# Patient Record
(demographics unavailable — no encounter records)

---

## 2024-11-03 NOTE — PHYSICAL EXAM
[Normal Appearance] : normal appearance [Well Groomed] : well groomed [General Appearance - In No Acute Distress] : no acute distress [Edema] : no peripheral edema [Respiration, Rhythm And Depth] : normal respiratory rhythm and effort [Exaggerated Use Of Accessory Muscles For Inspiration] : no accessory muscle use [Abdomen Soft] : soft [Abdomen Tenderness] : non-tender [Costovertebral Angle Tenderness] : no ~M costovertebral angle tenderness [Urinary Bladder Findings] : the bladder was normal on palpation [Normal Station and Gait] : the gait and station were normal for the patient's age [] : no rash [No Focal Deficits] : no focal deficits [Oriented To Time, Place, And Person] : oriented to person, place, and time [Affect] : the affect was normal [Mood] : the mood was normal [No Palpable Adenopathy] : no palpable adenopathy [de-identified] : no tenderness, No clinical bulge. Q tip negative

## 2024-11-03 NOTE — END OF VISIT
Number Of Treatment Days: 1 Dimensions-X Axis In Cm: 1.5 [Time Spent: ___ minutes] : I have spent [unfilled] minutes of time on the encounter which excludes teaching and separately reported services. Detail Level: Detailed Treatment Margins In Cm: 0.8 Energy (Optional-Please Include Units): 70kV Total Number Of Fractions Rx 3: 15 Total Dose (Optional-Please Include Units): 5750.25cGy Render Additional Prescriptions In Note?: No Fractions / Week Rx 3: 5 Treatment Device Design After Initial Simulation Justification (Will Render If Bill For Treatment Devices = Yes): The patient is status post radiation simulation and is evaluated as to the use of additional devices for shielding and placement for radiation therapy. Fractions / Week: 3 Functional Status: 0 (fully active) Include Rx 4 When Rendering Additional Prescriptions: Yes Depth (Optional-Please Include Units): 1.98 Pathology Override (Pathology Will Render As Diagnosis Name If Left Blank): Squamous Cell Carcinoma-Invasive Additional Prescription Justification Text: If there is any interruption in treatment exceeding 5 days please see Decay and Dose Adjustment Calculation and complete treatment under Prescription 2, 3 or 4 as appropriate. Limb Positioning Or Elevation: Legs Extended Simple Simulation Afterword Text Will Be Included With Simple Simulations (Indications............): The patient had a complete consultation regarding all applicable modalities for the treatment of their lesion and based on a variety of factors including the type of lesion, size, and location, the relevant medical history as well as local tissue factors, the functional status of the individual, the ability to perform necessary postoperative wound instructions and the need for simultaneous treatments as well as overall wound healing status, it was determined that the patient would begin radiation therapy treatment for skin cancer.  A full simulation and treatment device design was performed including the determination and formulation of appropriate simple and complex devices including lead shield of 2.286 mm thickness to form molded customized shielding to specifically correlate with the lesion size including treatment margin.  The custom lead shield is adequate to accommodate the appropriate applicator and provide adequate shielding around the treatment site.  The specific field applicator, shields, and devices both simple and complex as well as the specific patient setup is outlined below.  The patient was given a full consent for superficial radiation to both verbally and in writing and the full determination of patient's eligibility for treatment and selection is outlined on the patient eligibility and treatment selection form.  The specific superficial radiotherapy prescription was determined and was documented on the superficial radiotherapy prescription form.  A treatment calculation was also performed and documented on the treatment calculation form.  Based on the prescription, the patient was scheduled for a series of fractional treatments. Fractionation Number: 16 Information: Selecting Yes will display possible errors in your note based on the variables you have selected. This validation is only offered as a suggestion for you. PLEASE NOTE THAT THE VALIDATION TEXT WILL BE REMOVED WHEN YOU FINALIZE YOUR NOTE. IF YOU WANT TO FAX A PRELIMINARY NOTE YOU WILL NEED TO TOGGLE THIS TO 'NO' IF YOU DO NOT WANT IT IN YOUR FAXED NOTE. Cumulative Dose In Cgy (Optional): 3680.16 Custom Shielding Afterword Text Will Not Be Included With Simple Simulations (X X Y Cm............): port to correlate with the lesion size, including treatment margin. The custom lead shield is adequate to accommodate the appropriate applicator and provide adequate shielding around the treatment site. Additional shielding (as noted below) is used to protect sensitive, normal tissues. Comments: RTOG 1 Treatment Time In Min (Optional): 0.33 Time Dose Fractionation (Optional- Include Units If Applicable): 93 Treatment Time / Fractionation (Optional- Include Units): 0.33min Total Number Of Fractions: 25 Field Size (Applicator): 4.0 cm Shielding Size (Optional- Include Units): 3.0x3.0 Initial Radiation Treatment Planning (Will Render If Bill Simulation = Yes): The patient had a complete consultation regarding all applicable modalities for the treatment of their skin cancer and based on a variety of factors including the type of lesion, size, and location, the relevant medical history as well as local tissue factors, the functional status of the individual, the ability to perform necessary postoperative wound instructions and the need for simultaneous treatments as well as overall wound healing status, it was determined that the patient would begin radiation therapy treatment for skin cancer.  A full simulation and treatment device design was performed including the determination and formulation of appropriate simple and complex devices including lead shield of 0.762 mm thickness to form molded customized shielding to specifically correlate with the lesion size including treatment margin.  The custom lead shield is adequate to accommodate the appropriate applicator and provide adequate shielding around the treatment site.  The specific field applicator, shields, and devices both simple and complex as well as the specific patient setup is outlined below.  The patient was given a full consent for superficial radiation to both verbally and in writing and the full determination of patient's eligibility for treatment and selection is outlined on the patient eligibility and treatment selection form.  The specific superficial radiotherapy prescription was determined and was documented on the superficial radiotherapy prescription form.  A treatment calculation was also performed and documented on the treatment calculation form.  Based on the prescription, the patient was scheduled for a series of fractional treatments. Custom Shielding Preamble Text Will Not Be Included With Simple Simulations (.......... X X Y Cm): A lead shield of 0.762 mm thickness is utilized to form a molded, custom shield with a Patient Positioning: Supine Dose Per Fractionation In Cgy (Optional): 230.01cGy Intro Statement (Will Not Render If Left Blank): The patient is undergoing superficial radiation therapy for skin cancer and presents for weekly evaluation and management.  Per protocol and as documented on the flow sheet, the patient was questioned as to subjective redness, pruritus, pain, drainage, fatigue, or any other symptoms.  Objectively, the radiation area was evaluated with regards to erythema, atrophy, scale, crusting, erosion, ulceration, edema, purpura, tenderness, warmth, drainage, and any other findings.  The plan was extensively reviewed including the dose, and dosing schedule.  The simulation and clinical setup was also reviewed as was the external and any internal shields and based on this review the appropriateness and sufficiency of treatment was determined. Assessment: Appropriate reaction Simple Simulation Preamble Text Will Be Included With Simple Simulations (.......... Indications): Simple simulation was performed today for the following reasons: Dose / Tx In Cgy (Optional): 230.01 Day Of The Week Treatment Administered: Friday

## 2024-11-03 NOTE — HISTORY OF PRESENT ILLNESS
[FreeTextEntry1] : Eight 63-year-old female presents for cystoscopy for history of incomplete bladder emptying post diverticulum repair x 2 with fascial sling. 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  Continues pelvic PT for rectal pain.

## 2024-11-03 NOTE — ASSESSMENT
[FreeTextEntry1] : 64 y/o s/p cystoscopy. Probable opening of urethral diverticulum on left distal ureter vs opening of skenes gland. No clinical diverticulum noted.  Vulvadynia stable with Est/Test vulva cream Can decrease to daily  Advised on Rectal Diazepam for rectal pain issues.  Follow up 3 months pelvci - q tip exam

## 2024-11-03 NOTE — ASSESSMENT
[FreeTextEntry1] : 62 y/o s/p cystoscopy. Probable opening of urethral diverticulum on left distal ureter vs opening of skenes gland. No clinical diverticulum noted.  Vulvadynia stable with Est/Test vulva cream Can decrease to daily  Advised on Rectal Diazepam for rectal pain issues.  Follow up 3 months pelvci - q tip exam

## 2024-11-03 NOTE — PHYSICAL EXAM
[Normal Appearance] : normal appearance [Well Groomed] : well groomed [General Appearance - In No Acute Distress] : no acute distress [Edema] : no peripheral edema [Respiration, Rhythm And Depth] : normal respiratory rhythm and effort [Exaggerated Use Of Accessory Muscles For Inspiration] : no accessory muscle use [Abdomen Soft] : soft [Abdomen Tenderness] : non-tender [Costovertebral Angle Tenderness] : no ~M costovertebral angle tenderness [Urinary Bladder Findings] : the bladder was normal on palpation [Normal Station and Gait] : the gait and station were normal for the patient's age [] : no rash [No Focal Deficits] : no focal deficits [Oriented To Time, Place, And Person] : oriented to person, place, and time [Affect] : the affect was normal [Mood] : the mood was normal [No Palpable Adenopathy] : no palpable adenopathy [de-identified] : no tenderness, No clinical bulge. Q tip negative

## 2025-03-10 NOTE — PHYSICAL EXAM
[Normal Appearance] : normal appearance [Well Groomed] : well groomed [General Appearance - In No Acute Distress] : no acute distress [Edema] : no peripheral edema [Respiration, Rhythm And Depth] : normal respiratory rhythm and effort [Exaggerated Use Of Accessory Muscles For Inspiration] : no accessory muscle use [Abdomen Soft] : soft [Abdomen Tenderness] : non-tender [Costovertebral Angle Tenderness] : no ~M costovertebral angle tenderness [Urinary Bladder Findings] : the bladder was normal on palpation [Normal Station and Gait] : the gait and station were normal for the patient's age [] : no rash [No Focal Deficits] : no focal deficits [Oriented To Time, Place, And Person] : oriented to person, place, and time [Affect] : the affect was normal [Mood] : the mood was normal [No Palpable Adenopathy] : no palpable adenopathy [de-identified] : No prolapse.  Right-sided positive Q-tip left-sided negative small irregularity no erythema not friable positive tenderness with Q-tip [Chaperone Present] : A chaperone was present in the examining room during all aspects of the physical examination [FreeTextEntry2] : Kelly Tay RN

## 2025-03-10 NOTE — ASSESSMENT
[FreeTextEntry1] : 63-year-old female with pelvic floor dysfunction and vulvodynia.  Responding well to testosterone in the past we will restart avidly.  Will restart avidly as well as Intrarosa for vaginal dryness.  Currently not sexually active secondary to 's illness. Follow-up in 3 months UTI symptoms reported office urine dip positive leukocyte Estrace we will send for culture to confirm.

## 2025-03-10 NOTE — HISTORY OF PRESENT ILLNESS
[FreeTextEntry1] : 63-year-old female with pelvic floor dysfunction rectal pain treated by pelvic floor therapy has not started rectal diazepam.  Reports has been sick and has decreased self-care and stopped Intrarosa.  Has just restarted Intrarosa needs refill for testosterone cream for vulvodynia Since last visit has had intermittent urinary tract symptoms which 2 out of 3 were negative with urine cultures at [facility].  Plans to forward results. Feels when goes off diet bladder goes into spasm.  This including junk food cauliflower puffs

## 2025-05-28 NOTE — ASSESSMENT
[FreeTextEntry1] : 63 y/o female with PFD/ Bladder Pain/ Chronic IC. Long standing urethral diverticulum with Vulvadynia Agrees to restart testosterone estradiol vulva cream wishes to have straight Cath for urine cx Urine dip - negative Restart Internal estradiol follow up in 6 weeks.

## 2025-05-28 NOTE — PHYSICAL EXAM
[Normal Appearance] : normal appearance [Well Groomed] : well groomed [General Appearance - In No Acute Distress] : no acute distress [Edema] : no peripheral edema [Respiration, Rhythm And Depth] : normal respiratory rhythm and effort [Exaggerated Use Of Accessory Muscles For Inspiration] : no accessory muscle use [Abdomen Soft] : soft [Abdomen Tenderness] : non-tender [Costovertebral Angle Tenderness] : no ~M costovertebral angle tenderness [Urethral Meatus] : the meatus of the urethra showed no abnormalities [Urinary Bladder Findings] : the bladder was normal on palpation [Normal Station and Gait] : the gait and station were normal for the patient's age [] : no rash [No Focal Deficits] : no focal deficits [Oriented To Time, Place, And Person] : oriented to person, place, and time [Affect] : the affect was normal [Mood] : the mood was normal [No Palpable Adenopathy] : no palpable adenopathy [de-identified] : erythema of vulva. Minimal hypertonus.  [Chaperoned Physical Exam] : A chaperone was present in the examining room during all aspects of the physical examination. [FreeTextEntry2] :  Kerry Shane M.A

## 2025-05-28 NOTE — HISTORY OF PRESENT ILLNESS
[FreeTextEntry1] : ARMANDO ROSE  64 year F presents for follow up. Patient has been taking care of  with Stage IV lung cancer. Has neglected her medical care. Thought she had a uti and tx with abx but cx returned negative. Bladder symptoms improved with generic Uribel.  Denies hematuria. History of urethral diverticulum with previous excision.  PFD with PFT by Dr. Gutiérrez Has not been applying Tesosterone estradiol to vulva nor vaginal estradiol cream.

## 2025-07-09 NOTE — PHYSICAL EXAM
[Normal Appearance] : normal appearance [Well Groomed] : well groomed [General Appearance - In No Acute Distress] : no acute distress [Edema] : no peripheral edema [Respiration, Rhythm And Depth] : normal respiratory rhythm and effort [Exaggerated Use Of Accessory Muscles For Inspiration] : no accessory muscle use [Abdomen Soft] : soft [Abdomen Tenderness] : non-tender [Costovertebral Angle Tenderness] : no ~M costovertebral angle tenderness [Urinary Bladder Findings] : the bladder was normal on palpation [Normal Station and Gait] : the gait and station were normal for the patient's age [] : no rash [No Focal Deficits] : no focal deficits [Oriented To Time, Place, And Person] : oriented to person, place, and time [Affect] : the affect was normal [Mood] : the mood was normal [No Palpable Adenopathy] : no palpable adenopathy [de-identified] : mild to moderate hyperonus with release with myofascial pressure and perineal stretch. [Chaperoned Physical Exam] : A chaperone was present in the examining room during all aspects of the physical examination. [FreeTextEntry2] :  Kerry Shane M.A

## 2025-07-09 NOTE — ASSESSMENT
[FreeTextEntry1] : 64-year-old female with multiple urological and general issues. Refills for vaginal estradiol twice weekly and Intrarosa the remainder of the time daily. Vaginal diazepam and oral diazepam Testosterone paste for vulvodynia Encourage follow-up with pelvic floor therapy for internal vaginal along with rectal therapy. Follow-up 3 months

## 2025-07-09 NOTE — HISTORY OF PRESENT ILLNESS
[FreeTextEntry1] : ARMANDO ROSE  64 year F presents for follow up.  Much improved with pelvic pain and vulvodynia.  Restarted testosterone vaginal cream estradiol Intrarosa and rectal Valium.  Has not seen pelvic floor therapist for the past 3 months.  Has not seen Candi Howard for over a year and a half.  She used to receive oral Valium for bladder spasms which helped.  And is currently requesting refills.  Past medical history is significant for urethral diverticulum with excision and recurrence.  Bartholin cyst with excision, history of bacterial vaginosis and urinary tract infections.  [Urinary Incontinence] : no urinary incontinence